# Patient Record
Sex: MALE | URBAN - METROPOLITAN AREA
[De-identification: names, ages, dates, MRNs, and addresses within clinical notes are randomized per-mention and may not be internally consistent; named-entity substitution may affect disease eponyms.]

---

## 2024-07-11 ENCOUNTER — APPOINTMENT (OUTPATIENT)
Dept: GASTROENTEROLOGY | Facility: CLINIC | Age: 24
End: 2024-07-11

## 2024-07-11 VITALS
BODY MASS INDEX: 27.09 KG/M2 | OXYGEN SATURATION: 98 % | WEIGHT: 200 LBS | HEIGHT: 72 IN | SYSTOLIC BLOOD PRESSURE: 144 MMHG | HEART RATE: 85 BPM | DIASTOLIC BLOOD PRESSURE: 73 MMHG

## 2024-07-11 DIAGNOSIS — K21.9 GASTROESOPHAGEAL REFLUX DISEASE, UNSPECIFIED WHETHER ESOPHAGITIS PRESENT: Primary | ICD-10-CM

## 2024-07-11 PROCEDURE — 1036F TOBACCO NON-USER: CPT | Performed by: INTERNAL MEDICINE

## 2024-07-11 PROCEDURE — 99204 OFFICE O/P NEW MOD 45 MIN: CPT | Performed by: INTERNAL MEDICINE

## 2024-07-11 RX ORDER — PANTOPRAZOLE SODIUM 20 MG/1
20 TABLET, DELAYED RELEASE ORAL
COMMUNITY
Start: 2024-07-01

## 2024-07-11 ASSESSMENT — ENCOUNTER SYMPTOMS
FATIGUE: 0
ARTHRALGIAS: 0
HEADACHES: 0
FEVER: 0
ROS GI COMMENTS: AS DETAILED ABOVE.
NERVOUS/ANXIOUS: 0
BRUISES/BLEEDS EASILY: 0
CHILLS: 0
UNEXPECTED WEIGHT CHANGE: 0
SHORTNESS OF BREATH: 0
NUMBNESS: 0
DYSURIA: 0
COUGH: 0
CONFUSION: 0
HEMATURIA: 0
MYALGIAS: 0
PALPITATIONS: 0
ADENOPATHY: 0
SORE THROAT: 0
DIZZINESS: 0
WHEEZING: 0
VOICE CHANGE: 0
SEIZURES: 0
TROUBLE SWALLOWING: 0
WEAKNESS: 0

## 2024-07-11 NOTE — LETTER
July 11, 2024     VANE Ortiz  [address]  [address]  [facility] [address]    Patient: Lázaro Soni   YOB: 2000   Date of Visit: 7/11/2024       Dear VANE Grissom:    Thank you for referring Lázaro Soni to me for evaluation. Below are my notes for this consultation.  If you have questions, please do not hesitate to call me. I look forward to following your patient along with you.       Sincerely,     Jan Schafer MD      CC: No Recipients  ______________________________________________________________________________________        [facility]    ASSESSMENT and PLAN:       Lázaro Soni is a 24 y.o. male with a significant past medical history of GERD and seasonal allergies who presents for consultation requested by his primary care provider (VANE Ortiz) for the evaluation of GERD.       GERD  Longstanding symptoms that have only partially responded to PPI. There is a family history of EoE and that could be an explanation for these symptoms that have incompletely responded to PPI. With persistent symptoms as well as family history will plan for EGD.  - continue PPI, discussed increased dose to see if there is improved response and he says that he wants to continue his current dose at this time  - EGD scheduled      Follow up in 3 months.          Jan Schafer MD        Gastroenterology    [facility]    Clinical   [facility]        Subjective   HISTORY OF PRESENT ILLNESS:     Chief Complaint  GERD    History Of Present Illness:    Lázaro Soni is a 24 y.o. male with a significant past medical history of GERD and seasonal allergies who presents for consultation requested by his primary care provider (VANE Ortiz) for the evaluation of GERD.       He was seen by his PCP in May 2024 and reported concern for acid  reflux and also reported dysphagia. He reported that his brother has eosinophilic esophagitis. He was started on low dose PPI (Omeprazole 20 mg daily) and referred to GI. He was seen by his PCP again in July and reported improvement with that medication.    No endoscopy reports in the EMR.      Today he says that he has had intermittent symptoms of heartburn with burning pressure in his chest and abdominal pain on the left side. In the past symptoms have been daily. Now symptoms are happening randomly and usually 2-3 times per week. He has noticed that certain foods including beer/wine trigger worse symptoms. He previously was a binge drinker. Symptoms are associated with a feeling of shortness of breath at times and globus sensation.    He says that he tried taking Famotidine and Tums as needed which didn't help. He has been on Pantoprazole (20 mg daily) for the last 4-6 weeks. He has noticed some mld improvement with that, but he continues to have symptoms. He did previously have rectal bleeding after one dose of Omeprazole so he didn't want to take that again.    He also reports some constipation with a BM every 1-2 days and a sensation of incomplete emptying.    Patient denies any N/V, dysphagia, odynophagia, diarrhea, hematemesis, hematochezia, melena, or weight loss.    Review of systems:   Review of Systems   Constitutional:  Negative for chills, fatigue, fever and unexpected weight change.   HENT:  Negative for congestion, sneezing, sore throat, trouble swallowing and voice change.    Respiratory:  Negative for cough, shortness of breath and wheezing.    Cardiovascular:  Negative for chest pain, palpitations and leg swelling.   Gastrointestinal:         As detailed above.   Genitourinary:  Negative for dysuria and hematuria.   Musculoskeletal:  Negative for arthralgias and myalgias.   Skin:  Negative for pallor and rash.   Neurological:  Negative for dizziness, seizures, syncope, weakness, numbness and  headaches.   Hematological:  Negative for adenopathy. Does not bruise/bleed easily.   Psychiatric/Behavioral:  Negative for confusion. The patient is not nervous/anxious.    All other systems reviewed and are negative.      I performed a complete 10 point review of systems and it is negative except as noted in HPI or above.      PAST HISTORIES:       Past Medical History:  Patient Active Problem List   Diagnosis   • Gastroesophageal reflux disease     He has no past medical history on file.    Past Surgical History:  He has no past surgical history on file.      Social History:  He reports that he has never smoked. He has never used smokeless tobacco. He reports current alcohol use. He reports that he does not use drugs.    Family History:  He says that his identical twin brother has Eosinophilic Esophagitis. Otherwise he denies any family history of pancreatitis, Crohn's, colon cancer, gastroesophageal cancer, or ulcerative colitis.    No family history on file.     Allergies:  Codeine      Objective   OBJECTIVE:       Last Recorded Vitals:  Vitals:    07/11/24 1123   BP: 144/73   Pulse: 85   SpO2: 98%   Weight: 90.7 kg (200 lb)   Height: 1.829 m (6')     /73   Pulse 85   Ht 1.829 m (6')   Wt 90.7 kg (200 lb)   SpO2 98%   BMI 27.12 kg/m²      Physical Exam:    Physical Exam  Vitals reviewed.   Constitutional:       General: He is not in acute distress.     Appearance: He is not ill-appearing.   HENT:      Head: Normocephalic and atraumatic.   Eyes:      General: No scleral icterus.  Cardiovascular:      Rate and Rhythm: Normal rate and regular rhythm.      Pulses: Normal pulses.      Heart sounds: Normal heart sounds. No murmur heard.  Pulmonary:      Effort: Pulmonary effort is normal. No respiratory distress.      Breath sounds: Normal breath sounds. No wheezing.   Abdominal:      General: Bowel sounds are normal.      Palpations: Abdomen is soft.      Tenderness: There is abdominal tenderness (mild  "epigastric). There is no rebound.   Musculoskeletal:         General: No swelling or deformity.   Skin:     General: Skin is warm and dry.      Coloration: Skin is not jaundiced.      Findings: No rash.   Neurological:      General: No focal deficit present.      Mental Status: He is alert and oriented to person, place, and time.   Psychiatric:         Mood and Affect: Mood normal.         Behavior: Behavior normal.         Thought Content: Thought content normal.         Judgment: Judgment normal.         Home Medications:  Prior to Admission medications    Not on File         Relevant Results Recent labs reviewed in the EMR.    No results found for: \"WBC\", \"HGB\", \"MCV\", \"PLT\", \"IRON\", \"TIBC\", \"IRONSAT\", \"FERRITIN\", \"[id number]\", \"FOLATE\"    No results found for: \"NA\", \"K\", \"CL\", \"BUN\", \"CREATININE\"    No results found for: \"BILITOT\", \"BILIDIR\", \"ALKPHOS\", \"AST\", \"ALT\", \"LIPASE\"    No results found for: \"CRP\", \"CALPS\"    Radiology: Imaging reviewed in the EMR.  No results found.          "

## 2024-07-11 NOTE — PATIENT INSTRUCTIONS
Continue Pantoprazole to block acid in your stomach.  You should take this medication every day.  Take it first thing in the morning about 30 minutes before breakfast.      You have been scheduled for an upper endoscopy (EGD).  You were given instructions for preparing for this test in the office today.  If you have questions about these instructions, please call my office at [phone].    After your procedure, you can expect me to talk to you to go over the results of the procedure.    You were also given information regarding the schedule for your procedure including the time that you need to arrive to the endoscopy unit.  You will also be contacted 2-3 day prior to your procedure to confirm the final arrival time.  If you have questions about this or if you need to cancel or change this appointment please call my office at [phone].       Follow up in 3 months.

## 2024-07-11 NOTE — PROGRESS NOTES
[facility]    ASSESSMENT and PLAN:       Lázaro Soni is a 24 y.o. male with a significant past medical history of GERD and seasonal allergies who presents for consultation requested by his primary care provider (VANE Ortiz) for the evaluation of GERD.       GERD  Longstanding symptoms that have only partially responded to PPI. There is a family history of EoE and that could be an explanation for these symptoms that have incompletely responded to PPI. With persistent symptoms as well as family history will plan for EGD.  - continue PPI, discussed increased dose to see if there is improved response and he says that he wants to continue his current dose at this time  - EGD scheduled      Follow up in 3 months.          Jan Schafer MD        Gastroenterology    [facility]    Clinical   [facility]        Subjective   HISTORY OF PRESENT ILLNESS:     Chief Complaint  GERD    History Of Present Illness:    Lázaro Soni is a 24 y.o. male with a significant past medical history of GERD and seasonal allergies who presents for consultation requested by his primary care provider (VANE Ortiz) for the evaluation of GERD.       He was seen by his PCP in May 2024 and reported concern for acid reflux and also reported dysphagia. He reported that his brother has eosinophilic esophagitis. He was started on low dose PPI (Omeprazole 20 mg daily) and referred to GI. He was seen by his PCP again in July and reported improvement with that medication.    No endoscopy reports in the EMR.      Today he says that he has had intermittent symptoms of heartburn with burning pressure in his chest and abdominal pain on the left side. In the past symptoms have been daily. Now symptoms are happening randomly and usually 2-3 times per week. He has noticed that certain foods including beer/wine  trigger worse symptoms. He previously was a binge drinker. Symptoms are associated with a feeling of shortness of breath at times and globus sensation.    He says that he tried taking Famotidine and Tums as needed which didn't help. He has been on Pantoprazole (20 mg daily) for the last 4-6 weeks. He has noticed some mld improvement with that, but he continues to have symptoms. He did previously have rectal bleeding after one dose of Omeprazole so he didn't want to take that again.    He also reports some constipation with a BM every 1-2 days and a sensation of incomplete emptying.    Patient denies any N/V, dysphagia, odynophagia, diarrhea, hematemesis, hematochezia, melena, or weight loss.    Review of systems:   Review of Systems   Constitutional:  Negative for chills, fatigue, fever and unexpected weight change.   HENT:  Negative for congestion, sneezing, sore throat, trouble swallowing and voice change.    Respiratory:  Negative for cough, shortness of breath and wheezing.    Cardiovascular:  Negative for chest pain, palpitations and leg swelling.   Gastrointestinal:         As detailed above.   Genitourinary:  Negative for dysuria and hematuria.   Musculoskeletal:  Negative for arthralgias and myalgias.   Skin:  Negative for pallor and rash.   Neurological:  Negative for dizziness, seizures, syncope, weakness, numbness and headaches.   Hematological:  Negative for adenopathy. Does not bruise/bleed easily.   Psychiatric/Behavioral:  Negative for confusion. The patient is not nervous/anxious.    All other systems reviewed and are negative.      I performed a complete 10 point review of systems and it is negative except as noted in HPI or above.      PAST HISTORIES:       Past Medical History:  Patient Active Problem List   Diagnosis    Gastroesophageal reflux disease     He has no past medical history on file.    Past Surgical History:  He has no past surgical history on file.      Social History:  He reports  that he has never smoked. He has never used smokeless tobacco. He reports current alcohol use. He reports that he does not use drugs.    Family History:  He says that his identical twin brother has Eosinophilic Esophagitis. Otherwise he denies any family history of pancreatitis, Crohn's, colon cancer, gastroesophageal cancer, or ulcerative colitis.    No family history on file.     Allergies:  Codeine      Objective   OBJECTIVE:       Last Recorded Vitals:  Vitals:    07/11/24 1123   BP: 144/73   Pulse: 85   SpO2: 98%   Weight: 90.7 kg (200 lb)   Height: 1.829 m (6')     /73   Pulse 85   Ht 1.829 m (6')   Wt 90.7 kg (200 lb)   SpO2 98%   BMI 27.12 kg/m²      Physical Exam:    Physical Exam  Vitals reviewed.   Constitutional:       General: He is not in acute distress.     Appearance: He is not ill-appearing.   HENT:      Head: Normocephalic and atraumatic.   Eyes:      General: No scleral icterus.  Cardiovascular:      Rate and Rhythm: Normal rate and regular rhythm.      Pulses: Normal pulses.      Heart sounds: Normal heart sounds. No murmur heard.  Pulmonary:      Effort: Pulmonary effort is normal. No respiratory distress.      Breath sounds: Normal breath sounds. No wheezing.   Abdominal:      General: Bowel sounds are normal.      Palpations: Abdomen is soft.      Tenderness: There is abdominal tenderness (mild epigastric). There is no rebound.   Musculoskeletal:         General: No swelling or deformity.   Skin:     General: Skin is warm and dry.      Coloration: Skin is not jaundiced.      Findings: No rash.   Neurological:      General: No focal deficit present.      Mental Status: He is alert and oriented to person, place, and time.   Psychiatric:         Mood and Affect: Mood normal.         Behavior: Behavior normal.         Thought Content: Thought content normal.         Judgment: Judgment normal.         Home Medications:  Prior to Admission medications    Not on File         Relevant  "Results Recent labs reviewed in the EMR.    No results found for: \"WBC\", \"HGB\", \"MCV\", \"PLT\", \"IRON\", \"TIBC\", \"IRONSAT\", \"FERRITIN\", \"[id number]\", \"FOLATE\"    No results found for: \"NA\", \"K\", \"CL\", \"BUN\", \"CREATININE\"    No results found for: \"BILITOT\", \"BILIDIR\", \"ALKPHOS\", \"AST\", \"ALT\", \"LIPASE\"    No results found for: \"CRP\", \"CALPS\"    Radiology: Imaging reviewed in the EMR.  No results found.          "

## 2024-08-28 ENCOUNTER — PREP FOR PROCEDURE (OUTPATIENT)
Dept: GASTROENTEROLOGY | Facility: CLINIC | Age: 24
End: 2024-08-28
Payer: COMMERCIAL

## 2024-08-28 RX ORDER — SODIUM CHLORIDE 9 MG/ML
20 INJECTION, SOLUTION INTRAVENOUS CONTINUOUS
Status: CANCELLED | OUTPATIENT
Start: 2024-08-28

## 2024-08-30 ENCOUNTER — ANESTHESIA EVENT (OUTPATIENT)
Dept: GASTROENTEROLOGY | Facility: HOSPITAL | Age: 24
End: 2024-08-30
Payer: COMMERCIAL

## 2024-08-30 ENCOUNTER — ANESTHESIA (OUTPATIENT)
Dept: GASTROENTEROLOGY | Facility: HOSPITAL | Age: 24
End: 2024-08-30
Payer: COMMERCIAL

## 2024-08-30 ENCOUNTER — HOSPITAL ENCOUNTER (OUTPATIENT)
Dept: GASTROENTEROLOGY | Facility: HOSPITAL | Age: 24
Discharge: HOME | End: 2024-08-30
Payer: COMMERCIAL

## 2024-08-30 VITALS
TEMPERATURE: 98.7 F | HEART RATE: 70 BPM | WEIGHT: 200 LBS | BODY MASS INDEX: 27.09 KG/M2 | RESPIRATION RATE: 16 BRPM | SYSTOLIC BLOOD PRESSURE: 127 MMHG | OXYGEN SATURATION: 97 % | HEIGHT: 72 IN | DIASTOLIC BLOOD PRESSURE: 99 MMHG

## 2024-08-30 DIAGNOSIS — K21.9 GASTROESOPHAGEAL REFLUX DISEASE WITHOUT ESOPHAGITIS: Primary | ICD-10-CM

## 2024-08-30 DIAGNOSIS — K21.9 GASTROESOPHAGEAL REFLUX DISEASE, UNSPECIFIED WHETHER ESOPHAGITIS PRESENT: ICD-10-CM

## 2024-08-30 PROCEDURE — 7100000009 HC PHASE TWO TIME - INITIAL BASE CHARGE

## 2024-08-30 PROCEDURE — 3700000002 HC GENERAL ANESTHESIA TIME - EACH INCREMENTAL 1 MINUTE

## 2024-08-30 PROCEDURE — 3700000001 HC GENERAL ANESTHESIA TIME - INITIAL BASE CHARGE

## 2024-08-30 PROCEDURE — 7100000010 HC PHASE TWO TIME - EACH INCREMENTAL 1 MINUTE

## 2024-08-30 PROCEDURE — 43239 EGD BIOPSY SINGLE/MULTIPLE: CPT | Performed by: INTERNAL MEDICINE

## 2024-08-30 PROCEDURE — 2500000005 HC RX 250 GENERAL PHARMACY W/O HCPCS: Performed by: NURSE ANESTHETIST, CERTIFIED REGISTERED

## 2024-08-30 PROCEDURE — 2500000004 HC RX 250 GENERAL PHARMACY W/ HCPCS (ALT 636 FOR OP/ED): Performed by: NURSE ANESTHETIST, CERTIFIED REGISTERED

## 2024-08-30 PROCEDURE — 2500000004 HC RX 250 GENERAL PHARMACY W/ HCPCS (ALT 636 FOR OP/ED): Performed by: INTERNAL MEDICINE

## 2024-08-30 RX ORDER — LIDOCAINE HYDROCHLORIDE 20 MG/ML
INJECTION, SOLUTION INFILTRATION; PERINEURAL AS NEEDED
Status: DISCONTINUED | OUTPATIENT
Start: 2024-08-30 | End: 2024-08-30

## 2024-08-30 RX ORDER — SODIUM CHLORIDE 9 MG/ML
20 INJECTION, SOLUTION INTRAVENOUS CONTINUOUS
Status: DISCONTINUED | OUTPATIENT
Start: 2024-08-30 | End: 2024-08-31 | Stop reason: HOSPADM

## 2024-08-30 RX ORDER — PROPOFOL 10 MG/ML
INJECTION, EMULSION INTRAVENOUS AS NEEDED
Status: DISCONTINUED | OUTPATIENT
Start: 2024-08-30 | End: 2024-08-30

## 2024-08-30 RX ORDER — FENTANYL CITRATE 50 UG/ML
INJECTION, SOLUTION INTRAMUSCULAR; INTRAVENOUS AS NEEDED
Status: DISCONTINUED | OUTPATIENT
Start: 2024-08-30 | End: 2024-08-30

## 2024-08-30 RX ORDER — PANTOPRAZOLE SODIUM 40 MG/1
40 TABLET, DELAYED RELEASE ORAL
Qty: 60 TABLET | Refills: 2 | Status: SHIPPED | OUTPATIENT
Start: 2024-08-30

## 2024-08-30 SDOH — HEALTH STABILITY: MENTAL HEALTH: CURRENT SMOKER: 0

## 2024-08-30 ASSESSMENT — PAIN SCALES - GENERAL
PAINLEVEL_OUTOF10: 0 - NO PAIN
PAIN_LEVEL: 0

## 2024-08-30 ASSESSMENT — COLUMBIA-SUICIDE SEVERITY RATING SCALE - C-SSRS
1. IN THE PAST MONTH, HAVE YOU WISHED YOU WERE DEAD OR WISHED YOU COULD GO TO SLEEP AND NOT WAKE UP?: NO
6. HAVE YOU EVER DONE ANYTHING, STARTED TO DO ANYTHING, OR PREPARED TO DO ANYTHING TO END YOUR LIFE?: NO
2. HAVE YOU ACTUALLY HAD ANY THOUGHTS OF KILLING YOURSELF?: NO

## 2024-08-30 ASSESSMENT — PAIN - FUNCTIONAL ASSESSMENT: PAIN_FUNCTIONAL_ASSESSMENT: 0-10

## 2024-08-30 NOTE — H&P
Pre-sedation Evaluation:  Sedation Necessary For: Analgesia  Sedation to be Managed By: Anesthesia (Monitored Anesthesia Care/MAC)    History of Present Illness and Indication for Procedure      Lázaro Soni is a 24 y.o. male with a history of GERD and seasonal allergies who presents for EGD to evaluate GERD.        He was seen by his PCP in May 2024 and reported concern for acid reflux and also reported dysphagia. He was started on low dose PPI (Omeprazole 20 mg daily) and referred to GI. He was seen by his PCP again in July and reported improvement with that medication.     He has never had an EGD before.        Today he says that he has had intermittent symptoms of heartburn with burning pressure in his chest and abdominal pain on the left side. In the past symptoms have been daily. Now symptoms are happening randomly and usually 2-3 times per week. Symptoms are associated with a feeling of shortness of breath at times and globus sensation.     He says that his identical twin brother has Eosinophilic Esophagitis. Otherwise he denies any family history of pancreatitis, Crohn's, colon cancer, gastroesophageal cancer, or ulcerative colitis.       NPO guidelines met: Yes         Review of Systems  Constitutional:  Negative for chills, fever and unexpected weight change.   HENT:  Negative for trouble swallowing.    Respiratory:  Negative for shortness of breath.    Cardiovascular:  Negative for chest pain.   Gastrointestinal:  As above.   Skin:  Negative for color change.       I performed a complete 10 point review of systems and it is negative except as noted in HPI or above. All other systems have been reviewed and are negative.      Patient Active Problem List   Diagnosis    Gastroesophageal reflux disease       Past Medical History:  He has a past medical history of GERD (gastroesophageal reflux disease).    Past Surgical History:  He has no past surgical history on file.      Social History:  He reports that  he has never smoked. He has never used smokeless tobacco. He reports current alcohol use. He reports that he does not use drugs.    Family History:  No family history on file.     Allergies:  Codeine    Current Medications  Current Outpatient Medications on File Prior to Encounter   Medication Sig Dispense Refill    pantoprazole (ProtoNix) 20 mg EC tablet Take 1 tablet (20 mg) by mouth once daily.       No current facility-administered medications on file prior to encounter.         Last Recorded Vitals  There were no vitals taken for this visit.      Physical Exam  Vitals reviewed.   Constitutional:       General: He is not in acute distress.     Appearance: He is not ill-appearing.   HENT:      Head: Normocephalic and atraumatic.      Mouth/Throat:      Comments: Mallampati: II  Cardiovascular:      Rate and Rhythm: Normal rate and regular rhythm.      Pulses: Normal pulses.      Heart sounds: Normal heart sounds. No murmur heard.  Pulmonary:      Effort: Pulmonary effort is normal. No respiratory distress.   Abdominal:      General: Bowel sounds are normal.      Palpations: Abdomen is soft.      Tenderness: There is no abdominal tenderness.   Skin:     General: Skin is warm and dry.   Neurological:      General: No focal deficit present.      Mental Status: He is alert and oriented to person, place, and time.              Assessment/Plan       EGD in endo with MAC sedation, ASA 1        Level of Sedation: Moderate Sedation  (Sedation medications to be delivered via monitored anesthesia care (MAC).     This evaluation serves as my H&P.     Outpatient medication list and allergies have been reviewed.  Pre-procedure/marisela procedure antibiotics not needed.     Pre-procedure evaluation completed by physician.           Jan Schafer MD

## 2024-08-30 NOTE — ANESTHESIA POSTPROCEDURE EVALUATION
Patient: Lázaro Soni    Procedure Summary       Date: 08/30/24 Room / Location: [facility]    Anesthesia Start: 1138 Anesthesia Stop: 1157    Procedure: EGD Diagnosis: Gastroesophageal reflux disease, unspecified whether esophagitis present    Scheduled Providers: Jan Schafer MD Responsible Provider: FRITZ Brown    Anesthesia Type: MAC ASA Status: 2            Anesthesia Type: MAC    Vitals Value Taken Time   /99 08/30/24 1216   Temp 37.1 °C (98.7 °F) 08/30/24 1216   Pulse 70 08/30/24 1216   Resp 16 08/30/24 1216   SpO2 97 % 08/30/24 1216       Anesthesia Post Evaluation    Patient location during evaluation: bedside  Patient participation: complete - patient participated  Level of consciousness: awake and alert  Pain score: 0  Pain management: adequate  Airway patency: patent  Cardiovascular status: acceptable and hemodynamically stable  Respiratory status: acceptable  Hydration status: acceptable  Postoperative Nausea and Vomiting: none        There were no known notable events for this encounter.

## 2024-08-30 NOTE — ANESTHESIA PREPROCEDURE EVALUATION
Patient: Lázaro Soni    Procedure Information       Anesthesia Start Date/Time: 08/30/24 1138    Scheduled providers: Jan Schafer MD    Procedure: EGD    Location: [facility]            Relevant Problems   Anesthesia (within normal limits)      Cardiac (within normal limits)      Pulmonary (within normal limits)      GI   (+) Gastroesophageal reflux disease      /Renal (within normal limits)      Liver (within normal limits)      Endocrine (within normal limits)      Hematology (within normal limits)      HEENT (within normal limits)       Clinical information reviewed:   Tobacco  Allergies  Meds   Med Hx  Surg Hx   Fam Hx  Soc Hx        NPO Detail:  NPO/Void Status  Carbohydrate Drink Given Prior to Surgery? : N  Date of Last Liquid: 08/29/24  Time of Last Liquid: 2300  Date of Last Solid: 08/29/24  Time of Last Solid: 1900  Last Intake Type: Clear fluids         Physical Exam    Airway  Mallampati: II  TM distance: >3 FB  Neck ROM: full     Cardiovascular - normal exam  Rhythm: regular  Rate: normal     Dental - normal exam     Pulmonary - normal exam     Abdominal - normal exam             Anesthesia Plan    History of general anesthesia?: yes  History of complications of general anesthesia?: no    ASA 2     MAC     The patient is not a current smoker.    intravenous induction   Anesthetic plan and risks discussed with patient.

## 2024-09-03 NOTE — ADDENDUM NOTE
Encounter addended by: Hillary Haynes RN on: 9/3/2024 11:25 AM   Actions taken: Contacts section saved, Flowsheet accepted

## 2024-09-10 LAB
LABORATORY COMMENT REPORT: NORMAL
PATH REPORT.COMMENTS IMP SPEC: NORMAL
PATH REPORT.FINAL DX SPEC: NORMAL
PATH REPORT.GROSS SPEC: NORMAL
PATH REPORT.RELEVANT HX SPEC: NORMAL
PATH REPORT.TOTAL CANCER: NORMAL

## 2024-10-14 ENCOUNTER — APPOINTMENT (OUTPATIENT)
Dept: GASTROENTEROLOGY | Facility: CLINIC | Age: 24
End: 2024-10-14
Payer: COMMERCIAL

## 2024-10-14 ENCOUNTER — TELEPHONE (OUTPATIENT)
Dept: GASTROENTEROLOGY | Facility: CLINIC | Age: 24
End: 2024-10-14

## 2024-10-14 VITALS
OXYGEN SATURATION: 98 % | HEART RATE: 82 BPM | DIASTOLIC BLOOD PRESSURE: 75 MMHG | HEIGHT: 72 IN | SYSTOLIC BLOOD PRESSURE: 125 MMHG | BODY MASS INDEX: 26.41 KG/M2 | WEIGHT: 195 LBS

## 2024-10-14 DIAGNOSIS — K21.9 GASTROESOPHAGEAL REFLUX DISEASE WITHOUT ESOPHAGITIS: ICD-10-CM

## 2024-10-14 DIAGNOSIS — K20.0 EOSINOPHILIC ESOPHAGITIS: ICD-10-CM

## 2024-10-14 PROCEDURE — 1036F TOBACCO NON-USER: CPT | Performed by: INTERNAL MEDICINE

## 2024-10-14 PROCEDURE — 3008F BODY MASS INDEX DOCD: CPT | Performed by: INTERNAL MEDICINE

## 2024-10-14 PROCEDURE — 99214 OFFICE O/P EST MOD 30 MIN: CPT | Performed by: INTERNAL MEDICINE

## 2024-10-14 RX ORDER — PANTOPRAZOLE SODIUM 40 MG/1
40 TABLET, DELAYED RELEASE ORAL
Qty: 60 TABLET | Refills: 5 | Status: SHIPPED | OUTPATIENT
Start: 2024-10-14

## 2024-10-14 ASSESSMENT — ENCOUNTER SYMPTOMS
WHEEZING: 0
PALPITATIONS: 0
DIZZINESS: 0
SORE THROAT: 0
SHORTNESS OF BREATH: 0
SEIZURES: 0
BRUISES/BLEEDS EASILY: 0
ARTHRALGIAS: 0
WEAKNESS: 0
FEVER: 0
ADENOPATHY: 0
HEADACHES: 0
VOICE CHANGE: 0
NERVOUS/ANXIOUS: 0
DYSURIA: 0
CONFUSION: 0
MYALGIAS: 0
COUGH: 0
UNEXPECTED WEIGHT CHANGE: 0
NUMBNESS: 0
TROUBLE SWALLOWING: 1
CHILLS: 0
HEMATURIA: 0
ROS GI COMMENTS: AS DETAILED ABOVE.
FATIGUE: 0

## 2024-10-14 NOTE — PROGRESS NOTES
[facility]enter[facility]    ASSESSMENT and PLAN:       Lázaro Soni is a 24 y.o. male with a significant past medical history of GERD, eosinophilic esophagitis and seasonal allergies who presents for follow up of eosinophilic esophagitis.       Eosinophilic esophagitis (EoE)  Endoscopic appearance and pathology consistent with EoE. He has had some improvement with PPI. Discussed treatment options including PPI, topical steroids, food elimination diets, and Dupixent. Given partial response to PPI will currently optimize the dose of PPI to recommended dose for EoE.  - twice daily Pantoprazole      Follow up in 3-4 months.        Jan Schafer MD        Senior Attending Physician, Gastroenterology    [facility]    Clinical   [employer]        Subjective   HISTORY OF PRESENT ILLNESS:     Chief Complaint  Follow-up and Eosinophilic Esophagitis    History Of Present Illness:    Lázaro Soni is a 24 y.o. male with a significant past medical history of GERD, eosinophilic esophagitis and seasonal allergies who presents for follow up of eosinophilic esophagitis.    he follows with (Koffi Aguilar, APRN-CNP) as his PCP.    I initially saw him in July 2024 at which time he reported chronic heartburn and pressure in his chest/globus sensation when eating certain foods. EGD was done on 8/30/2024 which showed changes consistent with eosinophilic esophagitis and pathology showed eosinophilia with up to 44 eosinophils per high power field. With the endoscopic appearance I had recommended an increased dose of PPI (40 mg BID).      Today he says that since the time of his upper endoscopy he has been taking Pantoprazole (40 mg) once daily. He feels some improvement with that and he does note that heartburn has significantly improved. He still has issues with certain foods (chicken and beans) leading to  pressure in his chest and a feeling of them sticking in his chest. He says that his brother is currently starting Dupixent and is also working on a food elimination diet. He says that he knows he would not want to do an elimination diet.        Endoscopy History  8/30/2024 - EGD: edema/exudate consistent with EoE - EREFS 1-0-2-1-0 (4) (eosinophilia with up to 44 eosinophils per high power field on path), normal stomach, normal duodenum      Review of systems:   Review of Systems   Constitutional:  Negative for chills, fatigue, fever and unexpected weight change.   HENT:  Positive for trouble swallowing. Negative for congestion, sneezing, sore throat and voice change.    Respiratory:  Negative for cough, shortness of breath and wheezing.    Cardiovascular:  Negative for chest pain, palpitations and leg swelling.   Gastrointestinal:         As detailed above.   Genitourinary:  Negative for dysuria and hematuria.   Musculoskeletal:  Negative for arthralgias and myalgias.   Skin:  Negative for pallor and rash.   Neurological:  Negative for dizziness, seizures, syncope, weakness, numbness and headaches.   Hematological:  Negative for adenopathy. Does not bruise/bleed easily.   Psychiatric/Behavioral:  Negative for confusion. The patient is not nervous/anxious.    All other systems reviewed and are negative.      I performed a complete 10 point review of systems and it is negative except as noted in HPI or above.      PAST HISTORIES:       Past Medical History:  Patient Active Problem List   Diagnosis    Gastroesophageal reflux disease    Eosinophilic esophagitis     He has a past medical history of GERD (gastroesophageal reflux disease).    Past Surgical History:  He has no past surgical history on file.      Social History:  He reports that he has never smoked. He has never used smokeless tobacco. He reports current alcohol use. He reports that he does not use drugs.    Family History:  He says that his identical twin  brother has Eosinophilic Esophagitis. Otherwise he denies any family history of pancreatitis, Crohn's, colon cancer, gastroesophageal cancer, or ulcerative colitis.    Family History   Problem Relation Name Age of Onset    Other (Eosinophilic Esophagitis) Brother          Allergies:  Codeine      Objective   OBJECTIVE:       Last Recorded Vitals:  Vitals:    10/14/24 1604   BP: 125/75   Pulse: 82   SpO2: 98%   Weight: 88.5 kg (195 lb)   Height: 1.829 m (6')     /75   Pulse 82   Ht 1.829 m (6')   Wt 88.5 kg (195 lb)   SpO2 98%   BMI 26.45 kg/m²      Physical Exam:    Physical Exam  Vitals reviewed.   Constitutional:       General: He is not in acute distress.     Appearance: He is not ill-appearing.   HENT:      Head: Normocephalic and atraumatic.   Eyes:      General: No scleral icterus.  Cardiovascular:      Rate and Rhythm: Normal rate and regular rhythm.      Pulses: Normal pulses.      Heart sounds: Normal heart sounds. No murmur heard.  Pulmonary:      Effort: Pulmonary effort is normal. No respiratory distress.      Breath sounds: Normal breath sounds. No wheezing.   Abdominal:      General: Bowel sounds are normal.      Palpations: Abdomen is soft.      Tenderness: There is no abdominal tenderness. There is no rebound.   Musculoskeletal:         General: No swelling or deformity.   Skin:     General: Skin is warm and dry.      Coloration: Skin is not jaundiced.      Findings: No rash.   Neurological:      General: No focal deficit present.      Mental Status: He is alert and oriented to person, place, and time.   Psychiatric:         Mood and Affect: Mood normal.         Behavior: Behavior normal.         Thought Content: Thought content normal.         Judgment: Judgment normal.         Home Medications:  Prior to Admission medications    Medication Sig Start Date End Date Taking? Authorizing Provider   pantoprazole (ProtoNix) 40 mg EC tablet Take 1 tablet (40 mg) by mouth 2 times a day before  "meals. 8/30/24   Jan Schafer MD         Relevant Results Recent labs reviewed in the EMR.    No results found for: \"WBC\", \"HGB\", \"MCV\", \"PLT\", \"IRON\", \"TIBC\", \"IRONSAT\", \"FERRITIN\", \"[id number]\", \"FOLATE\"    No results found for: \"NA\", \"K\", \"CL\", \"BUN\", \"CREATININE\"    No results found for: \"BILITOT\", \"BILIDIR\", \"ALKPHOS\", \"AST\", \"ALT\", \"LIPASE\"    No results found for: \"CRP\", \"CALPS\"    Radiology: Imaging reviewed in the EMR.  No results found.          "

## 2024-10-14 NOTE — LETTER
October 14, 2024     VANE Ortiz  [address]  [address]  [place] [place] [address]    Patient: Lázaro Soni   YOB: 2000   Date of Visit: 10/14/2024       Dear VANE Grissom:    Thank you for referring Lázaro Soni to me for evaluation. Below are my notes for this consultation.  If you have questions, please do not hesitate to call me. I look forward to following your patient along with you.       Sincerely,     Jan Schafer MD      CC: No Recipients  ______________________________________________________________________________________        [facility]enter[facility]    ASSESSMENT and PLAN:       Lázaro Soni is a 24 y.o. male with a significant past medical history of GERD, eosinophilic esophagitis and seasonal allergies who presents for follow up of eosinophilic esophagitis.       Eosinophilic esophagitis (EoE)  Endoscopic appearance and pathology consistent with EoE. He has had some improvement with PPI. Discussed treatment options including PPI, topical steroids, food elimination diets, and Dupixent. Given partial response to PPI will currently optimize the dose of PPI to recommended dose for EoE.  - twice daily Pantoprazole      Follow up in 3-4 months.        Jan Schafer MD        Senior Attending Physician, Gastroenterology    [facility]    Clinical   [facility] [employer]        Subjective  HISTORY OF PRESENT ILLNESS:     Chief Complaint  Follow-up and Eosinophilic Esophagitis    History Of Present Illness:    Lázaro Soni is a 24 y.o. male with a significant past medical history of GERD, eosinophilic esophagitis and seasonal allergies who presents for follow up of eosinophilic esophagitis.    he follows with (VANE Ortiz) as his PCP.    I initially saw him in July 2024 at which time he reported chronic heartburn and pressure in  his chest/globus sensation when eating certain foods. EGD was done on 8/30/2024 which showed changes consistent with eosinophilic esophagitis and pathology showed eosinophilia with up to 44 eosinophils per high power field. With the endoscopic appearance I had recommended an increased dose of PPI (40 mg BID).      Today he says that since the time of his upper endoscopy he has been taking Pantoprazole (40 mg) once daily. He feels some improvement with that and he does note that heartburn has significantly improved. He still has issues with certain foods (chicken and beans) leading to pressure in his chest and a feeling of them sticking in his chest. He says that his brother is currently starting Dupixent and is also working on a food elimination diet. He says that he knows he would not want to do an elimination diet.        Endoscopy History  8/30/2024 - EGD: edema/exudate consistent with EoE - EREFS 1-0-2-1-0 (4) (eosinophilia with up to 44 eosinophils per high power field on path), normal stomach, normal duodenum      Review of systems:   Review of Systems   Constitutional:  Negative for chills, fatigue, fever and unexpected weight change.   HENT:  Positive for trouble swallowing. Negative for congestion, sneezing, sore throat and voice change.    Respiratory:  Negative for cough, shortness of breath and wheezing.    Cardiovascular:  Negative for chest pain, palpitations and leg swelling.   Gastrointestinal:         As detailed above.   Genitourinary:  Negative for dysuria and hematuria.   Musculoskeletal:  Negative for arthralgias and myalgias.   Skin:  Negative for pallor and rash.   Neurological:  Negative for dizziness, seizures, syncope, weakness, numbness and headaches.   Hematological:  Negative for adenopathy. Does not bruise/bleed easily.   Psychiatric/Behavioral:  Negative for confusion. The patient is not nervous/anxious.    All other systems reviewed and are negative.      I performed a complete 10  point review of systems and it is negative except as noted in HPI or above.      PAST HISTORIES:       Past Medical History:  Patient Active Problem List   Diagnosis   • Gastroesophageal reflux disease   • Eosinophilic esophagitis     He has a past medical history of GERD (gastroesophageal reflux disease).    Past Surgical History:  He has no past surgical history on file.      Social History:  He reports that he has never smoked. He has never used smokeless tobacco. He reports current alcohol use. He reports that he does not use drugs.    Family History:  He says that his identical twin brother has Eosinophilic Esophagitis. Otherwise he denies any family history of pancreatitis, Crohn's, colon cancer, gastroesophageal cancer, or ulcerative colitis.    Family History   Problem Relation Name Age of Onset   • Other (Eosinophilic Esophagitis) Brother          Allergies:  Codeine      Objective  OBJECTIVE:       Last Recorded Vitals:  Vitals:    10/14/24 1604   BP: 125/75   Pulse: 82   SpO2: 98%   Weight: 88.5 kg (195 lb)   Height: 1.829 m (6')     /75   Pulse 82   Ht 1.829 m (6')   Wt 88.5 kg (195 lb)   SpO2 98%   BMI 26.45 kg/m²      Physical Exam:    Physical Exam  Vitals reviewed.   Constitutional:       General: He is not in acute distress.     Appearance: He is not ill-appearing.   HENT:      Head: Normocephalic and atraumatic.   Eyes:      General: No scleral icterus.  Cardiovascular:      Rate and Rhythm: Normal rate and regular rhythm.      Pulses: Normal pulses.      Heart sounds: Normal heart sounds. No murmur heard.  Pulmonary:      Effort: Pulmonary effort is normal. No respiratory distress.      Breath sounds: Normal breath sounds. No wheezing.   Abdominal:      General: Bowel sounds are normal.      Palpations: Abdomen is soft.      Tenderness: There is no abdominal tenderness. There is no rebound.   Musculoskeletal:         General: No swelling or deformity.   Skin:     General: Skin is warm  "and dry.      Coloration: Skin is not jaundiced.      Findings: No rash.   Neurological:      General: No focal deficit present.      Mental Status: He is alert and oriented to person, place, and time.   Psychiatric:         Mood and Affect: Mood normal.         Behavior: Behavior normal.         Thought Content: Thought content normal.         Judgment: Judgment normal.         Home Medications:  Prior to Admission medications    Medication Sig Start Date End Date Taking? Authorizing Provider   pantoprazole (ProtoNix) 40 mg EC tablet Take 1 tablet (40 mg) by mouth 2 times a day before meals. 8/30/24   Jan Schafer MD         Relevant Results Recent labs reviewed in the EMR.    No results found for: \"WBC\", \"HGB\", \"MCV\", \"PLT\", \"IRON\", \"TIBC\", \"IRONSAT\", \"FERRITIN\", \"[id number]\", \"FOLATE\"    No results found for: \"NA\", \"K\", \"CL\", \"BUN\", \"CREATININE\"    No results found for: \"BILITOT\", \"BILIDIR\", \"ALKPHOS\", \"AST\", \"ALT\", \"LIPASE\"    No results found for: \"CRP\", \"CALPS\"    Radiology: Imaging reviewed in the EMR.  No results found.          "

## 2024-10-14 NOTE — PATIENT INSTRUCTIONS
Continue Pantoprazole to block acid in your stomach.  You should take this medication every day.  Take it first thing in the morning about 30 minutes before breakfast.  If you have been prescribed this medicine twice daily you should take the second dose about 30 minutes before dinner.      Eosinophilic Esophagitis (EoE):  A chronic allergic/immune condition resulting in inflammation of the esophagus  Eosinophils (a particular type of white blood cell) is found in large numbers in the esophagus  Immune responses to foods are the main causes of EoE  Treatment is initially done with acid blockers (PPIs such as Omeprazole or Prilosec)  Additional treatments including swallowed steroids, food elimination diets, on injection medications can be used  Swallowed steroids are topical treatments that use steroids delivered directly to the esophagus to decrease inflammation.  Injection medication called Dupixent  Elimination diet (6 food elimination diets or elemental diets) can also be used to address the inflammation  6 Food Elimination diets work to exclude the most common food allergens (dairy, egg, wheat, soy, peanut, tree nuts, and fish/shellfish), but this can be difficult to follow without the help of a dietician      Resources:  American College of Gastroenterology  http://patients.gi.org/topics/eosinophilic-esophagitis/    American Academy of Allergy, Asthma, and Immunology  http://www.aaaai.org/conditions-and-treatments/related-conditions/eosinophilic-esophagitis        Follow up in 3-4 months.

## 2024-10-15 NOTE — TELEPHONE ENCOUNTER
I called [employer] and spoke with hai. She stated that the pt insurance will only pay for #90 in 365 days. Pt has already reached this limit. If pt was to pay out of pocket it would be $19.28 for a 30 day supply.     I tried to call pt insurance [phone] spoke to Mahin who transferred me to the PA dept [phone] opt#3. Received message that the PA department was in an extended absence and no way to leave a message and call disconnected.     Pt notified to contact his insurance to see if there is a PA or override form we can fill out to try to get it covered. I advised him of the out of pocket cost for medication.

## 2025-03-04 ENCOUNTER — TELEPHONE (OUTPATIENT)
Facility: CLINIC | Age: 25
End: 2025-03-04
Payer: COMMERCIAL

## 2025-03-04 NOTE — TELEPHONE ENCOUNTER
Contacted pt to schedule follow up - pt has moved to [place] temporarily and will contact us when/if he moves back to [place]